# Patient Record
Sex: FEMALE | Race: WHITE | NOT HISPANIC OR LATINO | Employment: OTHER | URBAN - METROPOLITAN AREA
[De-identification: names, ages, dates, MRNs, and addresses within clinical notes are randomized per-mention and may not be internally consistent; named-entity substitution may affect disease eponyms.]

---

## 2017-02-03 ENCOUNTER — OFFICE VISIT (OUTPATIENT)
Dept: CARDIOLOGY | Facility: CLINIC | Age: 72
End: 2017-02-03

## 2017-02-03 VITALS
SYSTOLIC BLOOD PRESSURE: 135 MMHG | BODY MASS INDEX: 27.5 KG/M2 | HEIGHT: 67 IN | HEART RATE: 79 BPM | WEIGHT: 175.2 LBS | DIASTOLIC BLOOD PRESSURE: 60 MMHG

## 2017-02-03 DIAGNOSIS — I50.22 CHRONIC SYSTOLIC CONGESTIVE HEART FAILURE (HCC): Primary | ICD-10-CM

## 2017-02-03 DIAGNOSIS — E78.2 MIXED HYPERLIPIDEMIA: ICD-10-CM

## 2017-02-03 DIAGNOSIS — I44.7 LBBB (LEFT BUNDLE BRANCH BLOCK): ICD-10-CM

## 2017-02-03 PROCEDURE — 99213 OFFICE O/P EST LOW 20 MIN: CPT | Performed by: INTERNAL MEDICINE

## 2017-02-03 NOTE — PROGRESS NOTES
[facility]  Office Progress Note  Monica Stafford  1945  [phone]      Visit Date: 02/03/2017    PCP: Julio Mason MD  [address] [facility]  [facility] [address]    IDENTIFICATION: A 71 y.o. female  from [place]/ [facility] in [place].    Chief Complaint   Patient presents with   • Follow-up     CHF/HTN/LBBB     PROBLEM LIST:  CHF  2008 Presumed viral myopathy  [facility] bx - med rx- resolved by 2009  LBBB chronic 2006 8/16 MPS wnl EF 70%  HL  HTN  Nissen fundoplication 2016 [facility]      Allergies  No Known Allergies    Current Medications    Current Outpatient Prescriptions:   •  allopurinol (ZYLOPRIM) 100 MG tablet, Take 100 mg by mouth daily., Disp: , Rfl:   •  aspirin 81 MG EC tablet, Take 81 mg by mouth daily., Disp: , Rfl:   •  atorvastatin (LIPITOR) 40 MG tablet, Take 40 mg by mouth daily., Disp: , Rfl:   •  Biotin 5000 MCG capsule, Take 5,000 mg by mouth., Disp: , Rfl:   •  carvedilol (COREG) 12.5 MG tablet, Take 12.5 mg by mouth 2 (two) times a day with meals., Disp: , Rfl:   •  Cholecalciferol (VITAMIN D) 2000 UNITS capsule, Take  by mouth., Disp: , Rfl:   •  docusate sodium (COLACE) 100 MG capsule, Take 100 mg by mouth daily., Disp: , Rfl:   •  furosemide (LASIX) 20 MG tablet, Take 20 mg by mouth daily as needed., Disp: , Rfl:   •  loratadine (CLARITIN) 10 MG tablet, Take 10 mg by mouth daily., Disp: , Rfl:   •  Melatonin 3 MG capsule, Take 3 mg by mouth daily., Disp: , Rfl:   •  potassium chloride (K-DUR) 10 MEQ CR tablet, Take 10 mEq by mouth daily as needed., Disp: , Rfl:   •  traZODone (DESYREL) 50 MG tablet, Take 50 mg by mouth every night., Disp: , Rfl:   •  valsartan (DIOVAN) 160 MG tablet, Take 160 mg by mouth daily., Disp: , Rfl:       History of Present Illness     Pt denies any chest pain, dyspnea, dyspnea on exertion, orthopnea, PND, palpitations, lower extremity edema.  [place]    ROS:  All systems have  "been reviewed and are negative with the exception of those mentioned in the HPI.    OBJECTIVE:  Vitals:    02/03/17 1429   BP: 135/60   BP Location: Left arm   Patient Position: Sitting   Pulse: 79   Weight: 175 lb 3.2 oz (79.5 kg)   Height: 67\" (170.2 cm)     Physical Exam   Constitutional: She appears well-developed and well-nourished.   Neck: Normal range of motion. Neck supple. No hepatojugular reflux and no JVD present. Carotid bruit is not present. No tracheal deviation present. No thyromegaly present.   Cardiovascular: Normal rate, regular rhythm, S1 normal, S2 normal, intact distal pulses and normal pulses.  PMI is not displaced.  Exam reveals no gallop, no distant heart sounds, no friction rub, no midsystolic click and no opening snap.    No murmur heard.  Pulses:       Radial pulses are 2+ on the right side, and 2+ on the left side.        Dorsalis pedis pulses are 2+ on the right side, and 2+ on the left side.        Posterior tibial pulses are 2+ on the right side, and 2+ on the left side.   Pulmonary/Chest: Effort normal and breath sounds normal. She has no wheezes. She has no rales.   Abdominal: Soft. Bowel sounds are normal. She exhibits no mass. There is no tenderness. There is no guarding.       Diagnostic Data:  Procedures      ASSESSMENT:   Diagnosis Plan   1. Chronic systolic congestive heart failure     2. LBBB (left bundle branch block)     3. Mixed hyperlipidemia         PLAN:  NICM - now preserved LVEF.    Chronic LBBB    HTN controlled on regimen    HL on statin        Julio Mason MD, thank you for referring Ms. Stafford for evaluation.  I have forwarded my electronically generated recommendations to you for review.  Please do not hesitate to call with any questions.      Jasvir Tan MD, [facility]  "

## 2018-02-09 ENCOUNTER — OFFICE VISIT (OUTPATIENT)
Dept: CARDIOLOGY | Facility: CLINIC | Age: 73
End: 2018-02-09

## 2018-02-09 VITALS
BODY MASS INDEX: 27.31 KG/M2 | HEIGHT: 67 IN | OXYGEN SATURATION: 97 % | SYSTOLIC BLOOD PRESSURE: 138 MMHG | WEIGHT: 174 LBS | DIASTOLIC BLOOD PRESSURE: 78 MMHG | HEART RATE: 68 BPM

## 2018-02-09 DIAGNOSIS — I44.7 LBBB (LEFT BUNDLE BRANCH BLOCK): ICD-10-CM

## 2018-02-09 DIAGNOSIS — R00.2 PALPITATIONS: Primary | ICD-10-CM

## 2018-02-09 DIAGNOSIS — E78.2 MIXED HYPERLIPIDEMIA: ICD-10-CM

## 2018-02-09 DIAGNOSIS — I10 ESSENTIAL HYPERTENSION: ICD-10-CM

## 2018-02-09 DIAGNOSIS — I50.22 CHRONIC SYSTOLIC CONGESTIVE HEART FAILURE (HCC): ICD-10-CM

## 2018-02-09 PROCEDURE — 99214 OFFICE O/P EST MOD 30 MIN: CPT | Performed by: INTERNAL MEDICINE

## 2018-02-09 RX ORDER — ZOLPIDEM TARTRATE 10 MG/1
10 TABLET ORAL AS NEEDED
COMMUNITY
Start: 2018-01-23 | End: 2020-01-13

## 2018-02-09 RX ORDER — LATANOPROST 50 UG/ML
1 SOLUTION/ DROPS OPHTHALMIC DAILY
COMMUNITY
Start: 2018-01-25

## 2018-02-09 NOTE — PROGRESS NOTES
[facility]  Office Progress Note  Monica Stafford  1945  [phone]      Visit Date: 02/03/2017    PCP: Julio Mason MD  [address] [facility]  [facility] [address]    IDENTIFICATION: A 72 y.o. female  from [place]/ [facility] in [place].    Chief Complaint   Patient presents with   • Congestive Heart Failure   • LBBB   • fluttering     PROBLEM LIST:  CHF  2008 Presumed viral myopathy  [facility] bx - med rx- resolved by 2009  LBBB chronic 2006 8/16 MPS wnl EF 70%  HL  HTN  Nissen fundoplication 2016 [facility]      Allergies  No Known Allergies    Current Medications    Current Outpatient Prescriptions:   •  allopurinol (ZYLOPRIM) 100 MG tablet, Take 100 mg by mouth daily., Disp: , Rfl:   •  aspirin 81 MG EC tablet, Take 81 mg by mouth daily., Disp: , Rfl:   •  atorvastatin (LIPITOR) 40 MG tablet, Take 40 mg by mouth daily., Disp: , Rfl:   •  Biotin 5000 MCG capsule, Take 5,000 mg by mouth Daily., Disp: , Rfl:   •  Calcium Carb-Cholecalciferol (CALCIUM + D3 PO), Take  by mouth Daily., Disp: , Rfl:   •  carvedilol (COREG) 12.5 MG tablet, Take 12.5 mg by mouth 2 (two) times a day with meals., Disp: , Rfl:   •  docusate sodium (COLACE) 100 MG capsule, Take 100 mg by mouth daily., Disp: , Rfl:   •  furosemide (LASIX) 20 MG tablet, Take 20 mg by mouth daily as needed., Disp: , Rfl:   •  latanoprost (XALATAN) 0.005 % ophthalmic solution, Administer 1 drop to both eyes Daily., Disp: , Rfl:   •  loratadine (CLARITIN) 10 MG tablet, Take 10 mg by mouth daily., Disp: , Rfl:   •  potassium chloride (K-DUR) 10 MEQ CR tablet, Take 10 mEq by mouth daily as needed., Disp: , Rfl:   •  traZODone (DESYREL) 50 MG tablet, Take 50 mg by mouth As Needed., Disp: , Rfl:   •  valsartan (DIOVAN) 160 MG tablet, Take 160 mg by mouth daily., Disp: , Rfl:   •  zolpidem (AMBIEN) 10 MG tablet, Every Night., Disp: , Rfl:       History of Present Illness     Pt Crescendo pattern of  "increased palpitations over the last several weeks.  Her mother passed this last November and she states she's not been resting well.  She has no regular exercise regimen but has been compliant with her medications    ROS:  All systems have been reviewed and are negative with the exception of those mentioned in the HPI.    OBJECTIVE:  Vitals:    02/09/18 1137 02/09/18 1253 02/09/18 1254   BP: 170/90 138/78 138/78   BP Location: Left arm     Patient Position: Sitting     Pulse: 68     SpO2: 97%     Weight: 78.9 kg (174 lb)     Height: 170.2 cm (67\")       Physical Exam   Constitutional: She appears well-developed and well-nourished.   Neck: Normal range of motion. Neck supple. No hepatojugular reflux and no JVD present. Carotid bruit is not present. No tracheal deviation present. No thyromegaly present.   Cardiovascular: Normal rate, regular rhythm, S1 normal, S2 normal, intact distal pulses and normal pulses.  PMI is not displaced.  Exam reveals no gallop, no distant heart sounds, no friction rub, no midsystolic click and no opening snap.    No murmur heard.  Pulses:       Radial pulses are 2+ on the right side, and 2+ on the left side.        Dorsalis pedis pulses are 2+ on the right side, and 2+ on the left side.        Posterior tibial pulses are 2+ on the right side, and 2+ on the left side.   Pulmonary/Chest: Effort normal and breath sounds normal. She has no wheezes. She has no rales.   Abdominal: Soft. Bowel sounds are normal. She exhibits no mass. There is no tenderness. There is no guarding.       Diagnostic Data:  Procedures      ASSESSMENT:   Diagnosis Plan   1. Palpitations  Holter Monitor - 72 Hour Up To 21 Days   2. Essential hypertension     3. Mixed hyperlipidemia     4. LBBB (left bundle branch block)     5. Chronic systolic congestive heart failure         PLAN:  Palpitations Will Pl., Zyloprim as delineate need for reevaluation of structural heart disease.  Otherwise we'll continue current medical " regimen    NICM - now preserved LVEF.    Chronic LBBB    HTN controlled on regimen    HL on statin        Julio Mason MD, thank you for referring Ms. Stafford for evaluation.  I have forwarded my electronically generated recommendations to you for review.  Please do not hesitate to call with any questions.      Jasvir Tan MD, FACC

## 2018-03-30 ENCOUNTER — TELEPHONE (OUTPATIENT)
Dept: CARDIOLOGY | Facility: CLINIC | Age: 73
End: 2018-03-30

## 2018-09-04 ENCOUNTER — TELEPHONE (OUTPATIENT)
Dept: CARDIOLOGY | Facility: CLINIC | Age: 73
End: 2018-09-04

## 2018-09-04 RX ORDER — LOSARTAN POTASSIUM 50 MG/1
50 TABLET ORAL DAILY
Qty: 30 TABLET | Refills: 11 | Status: SHIPPED | OUTPATIENT
Start: 2018-09-04 | End: 2019-09-06 | Stop reason: SDUPTHER

## 2018-09-04 NOTE — TELEPHONE ENCOUNTER
The patient called inquiring as to what you would like to change her valsartan to.  Hx: chronic systolic CHF and essential htn.  Please advise.

## 2019-01-07 ENCOUNTER — OFFICE VISIT (OUTPATIENT)
Dept: CARDIOLOGY | Facility: CLINIC | Age: 74
End: 2019-01-07

## 2019-01-07 VITALS
OXYGEN SATURATION: 94 % | HEART RATE: 68 BPM | DIASTOLIC BLOOD PRESSURE: 70 MMHG | HEIGHT: 67 IN | WEIGHT: 175 LBS | SYSTOLIC BLOOD PRESSURE: 124 MMHG | BODY MASS INDEX: 27.47 KG/M2

## 2019-01-07 DIAGNOSIS — I10 ESSENTIAL HYPERTENSION: ICD-10-CM

## 2019-01-07 DIAGNOSIS — R00.2 PALPITATIONS: Primary | ICD-10-CM

## 2019-01-07 DIAGNOSIS — I44.7 LBBB (LEFT BUNDLE BRANCH BLOCK): ICD-10-CM

## 2019-01-07 DIAGNOSIS — I50.22 CHRONIC SYSTOLIC CONGESTIVE HEART FAILURE (HCC): ICD-10-CM

## 2019-01-07 PROCEDURE — 99213 OFFICE O/P EST LOW 20 MIN: CPT | Performed by: INTERNAL MEDICINE

## 2019-01-07 NOTE — PROGRESS NOTES
[facility]  Office Progress Note  Monica Stafford  1945  [phone]      Visit Date: 1/7/2019      PCP: Julio Mason MD  [address] [facility] [address]    IDENTIFICATION: A 73 y.o. female  from [place]/ [facility] [place].    Chief Complaint   Patient presents with   • Congestive Heart Failure     PROBLEM LIST:  CHF  2008 Presumed viral myopathy  [facility] [place] bx - med rx- resolved by 2009  LBBB chronic 2006 8/16 MPS wnl EF 70%  2018 zio wnl  HL  HTN  Nissen fundoplication 2016 [facility]      Allergies  No Known Allergies    Current Medications    Current Outpatient Medications:   •  allopurinol (ZYLOPRIM) 100 MG tablet, Take 100 mg by mouth daily., Disp: , Rfl:   •  aspirin 81 MG EC tablet, Take 81 mg by mouth daily., Disp: , Rfl:   •  atorvastatin (LIPITOR) 40 MG tablet, Take 40 mg by mouth daily., Disp: , Rfl:   •  Biotin 5000 MCG capsule, Take 5,000 mg by mouth Daily., Disp: , Rfl:   •  Calcium Carb-Cholecalciferol (CALCIUM + D3 PO), Take  by mouth Daily., Disp: , Rfl:   •  carvedilol (COREG) 12.5 MG tablet, Take 12.5 mg by mouth 2 (two) times a day with meals., Disp: , Rfl:   •  docusate sodium (COLACE) 100 MG capsule, Take 100 mg by mouth As Needed., Disp: , Rfl:   •  furosemide (LASIX) 20 MG tablet, Take 20 mg by mouth daily as needed., Disp: , Rfl:   •  latanoprost (XALATAN) 0.005 % ophthalmic solution, Administer 1 drop to both eyes Daily., Disp: , Rfl:   •  loratadine (CLARITIN) 10 MG tablet, Take 10 mg by mouth daily., Disp: , Rfl:   •  losartan (COZAAR) 50 MG tablet, Take 1 tablet by mouth Daily., Disp: 30 tablet, Rfl: 11  •  potassium chloride (K-DUR) 10 MEQ CR tablet, Take 10 mEq by mouth daily as needed., Disp: , Rfl:   •  zolpidem (AMBIEN) 10 MG tablet, 10 mg As Needed., Disp: , Rfl:       History of Present Illness     Pt decreased  palpitations since her elderly parents passed last year.    She has no regular exercise  "regimen but has been compliant with her medications.  Planning to visit [place] to her condo this winter.    ROS:  All systems have been reviewed and are negative with the exception of those mentioned in the HPI.    OBJECTIVE:  Vitals:    01/07/19 1103   BP: 124/70   BP Location: Right arm   Patient Position: Sitting   Pulse: 68   SpO2: 94%   Weight: 79.4 kg (175 lb)   Height: 170.2 cm (67\")     Physical Exam   Constitutional: She appears well-developed and well-nourished.   Neck: Normal range of motion. Neck supple. No hepatojugular reflux and no JVD present. Carotid bruit is not present. No tracheal deviation present. No thyromegaly present.   Cardiovascular: Normal rate, regular rhythm, S1 normal, S2 normal, intact distal pulses and normal pulses. PMI is not displaced. Exam reveals no gallop, no distant heart sounds, no friction rub, no midsystolic click and no opening snap.   No murmur heard.  Pulses:       Radial pulses are 2+ on the right side, and 2+ on the left side.        Dorsalis pedis pulses are 2+ on the right side, and 2+ on the left side.        Posterior tibial pulses are 2+ on the right side, and 2+ on the left side.   Pulmonary/Chest: Effort normal and breath sounds normal. She has no wheezes. She has no rales.   Abdominal: Soft. Bowel sounds are normal. She exhibits no mass. There is no tenderness. There is no guarding.       Diagnostic Data:  Procedures      ASSESSMENT:   Diagnosis Plan   1. Palpitations     2. Chronic systolic congestive heart failure (CMS/HCC)     3. LBBB (left bundle branch block)     4. Essential hypertension         PLAN:  Palpitations Will Pl.,  Otherwise we'll continue current medical regimen    NICM - now preserved LVEF.    Chronic LBBB    HTN controlled on regimen    HL on statin        Julio Mason MD, thank you for referring Ms. Stafford for evaluation.  I have forwarded my electronically generated recommendations to you for review.  Please do not hesitate " to call with any questions.      Jasvir Tan MD, FACC

## 2019-09-06 RX ORDER — LOSARTAN POTASSIUM 50 MG/1
TABLET ORAL
Qty: 30 TABLET | Refills: 4 | Status: SHIPPED | OUTPATIENT
Start: 2019-09-06 | End: 2019-09-17 | Stop reason: SDUPTHER

## 2019-09-17 RX ORDER — LOSARTAN POTASSIUM 50 MG/1
50 TABLET ORAL DAILY
Qty: 90 TABLET | Refills: 3 | Status: SHIPPED | OUTPATIENT
Start: 2019-09-17 | End: 2020-10-09

## 2020-01-08 NOTE — PROGRESS NOTES
[facility]  Office Progress Note  Monica Stafford  1945      Visit Date: 01/13/20    PCP: Julio Mason MD  [address] [facility] [address]    IDENTIFICATION: A 74 y.o. female  from [place]/ [facility] in [place].      PROBLEM LIST:  1. CHF  1. 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 [facility] bx - med rx- resolved by 2009  2. LBBB chronic 2006  1. 8/16 MPS wnl EF 70%  2. 2018 zio wnl  3. HL  4. HTN  5. Nissen fundoplication 2016 [facility]    Chief Complaint   Patient presents with   • Congestive Heart Failure       Allergies  No Known Allergies    Current Medications    Current Outpatient Medications:   •  allopurinol (ZYLOPRIM) 100 MG tablet, Take 100 mg by mouth daily., Disp: , Rfl:   •  aspirin 81 MG EC tablet, Take 81 mg by mouth daily., Disp: , Rfl:   •  atorvastatin (LIPITOR) 40 MG tablet, Take 40 mg by mouth daily., Disp: , Rfl:   •  Biotin 5000 MCG capsule, Take 5,000 mg by mouth Daily., Disp: , Rfl:   •  Calcium Carb-Cholecalciferol (CALCIUM + D3 PO), Take  by mouth Daily., Disp: , Rfl:   •  carvedilol (COREG) 12.5 MG tablet, Take 12.5 mg by mouth 2 (two) times a day with meals., Disp: , Rfl:   •  docusate sodium (COLACE) 100 MG capsule, Take 100 mg by mouth As Needed., Disp: , Rfl:   •  furosemide (LASIX) 20 MG tablet, Take 20 mg by mouth daily as needed., Disp: , Rfl:   •  latanoprost (XALATAN) 0.005 % ophthalmic solution, Administer 1 drop to both eyes Daily., Disp: , Rfl:   •  loratadine (CLARITIN) 10 MG tablet, Take 10 mg by mouth daily., Disp: , Rfl:   •  losartan (COZAAR) 50 MG tablet, Take 1 tablet by mouth Daily., Disp: 90 tablet, Rfl: 3  •  montelukast (SINGULAIR) 10 MG tablet, 10 mg Daily., Disp: , Rfl:   •  potassium chloride (K-DUR) 10 MEQ CR tablet, Take 10 mEq by mouth daily as needed., Disp: , Rfl:   •  traZODone (DESYREL) 50 MG tablet, 50 mg Every Night., Disp: , Rfl:       History of Present Illness   Monica Stafford is a  "74 y.o. year old female here for follow up. Reports she has started allergy injections, is hoping this helps with upcoming allergy season this spring. Reports having one episode of CP radiating to her back when driving to [place] in October, she took 1 spray of ntg and pulled over, but the pain subsided quickly. Pt denies any other chest pain, dyspnea, dyspnea on exertion, orthopnea, PND, palpitations, lower extremity edema, or claudication. She stays active, does yardwork in the summer, goes to the gym usually a few times a week in the winter. BP is controlled. Weight is down 11 lbs.       OBJECTIVE:  Vitals:    01/13/20 0956   BP: 122/68   BP Location: Right arm   Patient Position: Sitting   Pulse: 68   SpO2: 97%   Weight: 74.7 kg (164 lb 9.6 oz)   Height: 170.2 cm (67\")     Physical Exam   Constitutional: She is oriented to person, place, and time. She appears well-developed and well-nourished. No distress.   Cardiovascular: Normal rate, regular rhythm, normal heart sounds and intact distal pulses.   No murmur heard.  Pulses:       Radial pulses are 2+ on the right side, and 2+ on the left side.        Dorsalis pedis pulses are 2+ on the right side, and 2+ on the left side.        Posterior tibial pulses are 2+ on the right side, and 2+ on the left side.   Pulmonary/Chest: Effort normal and breath sounds normal. She has no wheezes. She has no rales.   Abdominal: Soft. Bowel sounds are normal. There is no tenderness. There is no guarding.   Musculoskeletal: She exhibits no edema or tenderness.   Neurological: She is alert and oriented to person, place, and time.   Skin: Skin is warm and dry. No rash noted.   Psychiatric: She has a normal mood and affect.   Nursing note and vitals reviewed.      Diagnostic Data:  Procedures      ASSESSMENT:   Diagnosis Plan   1. Palpitations     2. LBBB (left bundle branch block)     3. Mixed hyperlipidemia     4. Essential hypertension         PLAN:  1. No breakthrough symptoms. " Continue current medical management and continue to exercise.    2. Chronic LBBB, will follow EKG again next year  3. Continue statin therapy.   4. Patient has acceptable BP. Continue current medical management. Counseled to regularly check BP at home with goal averaging <130/80.     Julio Mason MD, thank you for referring Ms. Stafford for evaluation.  I have forwarded my electronically generated recommendations to you for review.  Please do not hesitate to call with any questions.    Scribed for Jasvir Tan MD by Ariana Carter PA-C. 1/13/2020  10:21 AM   I, Jasvir Tan MD, personally performed the services described in this documentation as scribed by the above named individual in my presence, and it is both accurate and complete.  1/13/2020  10:21 AM    Jasvir Tan MD, [facility]

## 2020-01-13 ENCOUNTER — OFFICE VISIT (OUTPATIENT)
Dept: CARDIOLOGY | Facility: CLINIC | Age: 75
End: 2020-01-13

## 2020-01-13 VITALS
DIASTOLIC BLOOD PRESSURE: 68 MMHG | WEIGHT: 164.6 LBS | SYSTOLIC BLOOD PRESSURE: 122 MMHG | HEIGHT: 67 IN | OXYGEN SATURATION: 97 % | BODY MASS INDEX: 25.83 KG/M2 | HEART RATE: 68 BPM

## 2020-01-13 DIAGNOSIS — R00.2 PALPITATIONS: Primary | ICD-10-CM

## 2020-01-13 DIAGNOSIS — I10 ESSENTIAL HYPERTENSION: ICD-10-CM

## 2020-01-13 DIAGNOSIS — I44.7 LBBB (LEFT BUNDLE BRANCH BLOCK): ICD-10-CM

## 2020-01-13 DIAGNOSIS — E78.2 MIXED HYPERLIPIDEMIA: ICD-10-CM

## 2020-01-13 PROCEDURE — 99214 OFFICE O/P EST MOD 30 MIN: CPT | Performed by: INTERNAL MEDICINE

## 2020-01-13 RX ORDER — TRAZODONE HYDROCHLORIDE 50 MG/1
50 TABLET ORAL NIGHTLY
COMMUNITY
Start: 2019-11-04

## 2020-01-13 RX ORDER — MONTELUKAST SODIUM 10 MG/1
10 TABLET ORAL DAILY
COMMUNITY
Start: 2020-01-10

## 2020-10-09 RX ORDER — LOSARTAN POTASSIUM 50 MG/1
TABLET ORAL
Qty: 90 TABLET | Refills: 2 | Status: SHIPPED | OUTPATIENT
Start: 2020-10-09 | End: 2021-03-01 | Stop reason: SDUPTHER

## 2021-01-11 ENCOUNTER — IMMUNIZATION (OUTPATIENT)
Dept: VACCINE CLINIC | Facility: HOSPITAL | Age: 76
End: 2021-01-11

## 2021-01-11 PROCEDURE — 0001A: CPT | Performed by: FAMILY MEDICINE

## 2021-01-11 PROCEDURE — 91300 HC SARSCOV02 VAC 30MCG/0.3ML IM: CPT | Performed by: FAMILY MEDICINE

## 2021-02-09 ENCOUNTER — IMMUNIZATION (OUTPATIENT)
Dept: VACCINE CLINIC | Facility: HOSPITAL | Age: 76
End: 2021-02-09

## 2021-02-09 PROCEDURE — 91300 HC SARSCOV02 VAC 30MCG/0.3ML IM: CPT | Performed by: INTERNAL MEDICINE

## 2021-02-09 PROCEDURE — 0002A: CPT | Performed by: INTERNAL MEDICINE

## 2021-03-01 ENCOUNTER — OFFICE VISIT (OUTPATIENT)
Dept: CARDIOLOGY | Facility: CLINIC | Age: 76
End: 2021-03-01

## 2021-03-01 VITALS
DIASTOLIC BLOOD PRESSURE: 68 MMHG | OXYGEN SATURATION: 96 % | HEIGHT: 67 IN | SYSTOLIC BLOOD PRESSURE: 116 MMHG | BODY MASS INDEX: 25.11 KG/M2 | HEART RATE: 73 BPM | WEIGHT: 160 LBS

## 2021-03-01 DIAGNOSIS — I10 ESSENTIAL HYPERTENSION: ICD-10-CM

## 2021-03-01 DIAGNOSIS — I44.7 LBBB (LEFT BUNDLE BRANCH BLOCK): ICD-10-CM

## 2021-03-01 DIAGNOSIS — E78.2 MIXED HYPERLIPIDEMIA: ICD-10-CM

## 2021-03-01 DIAGNOSIS — I50.22 CHRONIC SYSTOLIC CONGESTIVE HEART FAILURE (HCC): Primary | ICD-10-CM

## 2021-03-01 PROCEDURE — 99214 OFFICE O/P EST MOD 30 MIN: CPT | Performed by: INTERNAL MEDICINE

## 2021-03-01 RX ORDER — FUROSEMIDE 20 MG/1
20 TABLET ORAL DAILY PRN
Qty: 30 TABLET | Refills: 3 | Status: SHIPPED | OUTPATIENT
Start: 2021-03-01 | End: 2022-02-11 | Stop reason: DRUGHIGH

## 2021-03-01 RX ORDER — ATORVASTATIN CALCIUM 40 MG/1
40 TABLET, FILM COATED ORAL DAILY
Qty: 90 TABLET | Refills: 3 | Status: SHIPPED | OUTPATIENT
Start: 2021-03-01 | End: 2021-10-04 | Stop reason: ALTCHOICE

## 2021-03-01 RX ORDER — CARVEDILOL 12.5 MG/1
12.5 TABLET ORAL 2 TIMES DAILY WITH MEALS
Qty: 90 TABLET | Refills: 3 | Status: SHIPPED | OUTPATIENT
Start: 2021-03-01 | End: 2023-02-16 | Stop reason: SDUPTHER

## 2021-03-01 RX ORDER — LOSARTAN POTASSIUM 50 MG/1
50 TABLET ORAL DAILY
Qty: 90 TABLET | Refills: 2 | Status: SHIPPED | OUTPATIENT
Start: 2021-03-01 | End: 2022-01-14

## 2021-03-01 NOTE — PROGRESS NOTES
[facility]  Office Progress Note  Monica Stafford  1945      Visit Date: 03/01/21    PCP: Julio Mason MD  [address] [facility] [address]    IDENTIFICATION: A 75 y.o. female  from [place]/ [facility] in [facility] [place].      PROBLEM LIST:  1. CHF  1. 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 [facility] bx - med rx- resolved by 2009  3. 8/12 echo osh EF 60%  2. LBBB chronic 2006  1. 8/16 MPS wnl EF 70%  2. 2018 zio wnl  3. HL  4. HTN  5. Nissen fundoplication 2016 [facility]  6. 1/21 partial colectomy w subsequent emergent reop (leak)- [facility]    Chief Complaint   Patient presents with   • Congestive Heart Failure       Allergies  No Known Allergies    Current Medications    Current Outpatient Medications:   •  allopurinol (ZYLOPRIM) 100 MG tablet, Take 100 mg by mouth daily., Disp: , Rfl:   •  aspirin 81 MG EC tablet, Take 81 mg by mouth daily., Disp: , Rfl:   •  atorvastatin (LIPITOR) 40 MG tablet, Take 1 tablet by mouth Daily., Disp: 90 tablet, Rfl: 3  •  Biotin 5000 MCG capsule, Take 5,000 mg by mouth Daily., Disp: , Rfl:   •  Calcium Carb-Cholecalciferol (CALCIUM + D3 PO), Take  by mouth Daily., Disp: , Rfl:   •  carvedilol (COREG) 12.5 MG tablet, Take 1 tablet by mouth 2 (Two) Times a Day With Meals., Disp: 90 tablet, Rfl: 3  •  docusate sodium (COLACE) 100 MG capsule, Take 100 mg by mouth As Needed., Disp: , Rfl:   •  furosemide (LASIX) 20 MG tablet, Take 1 tablet by mouth Daily As Needed (weight gain 2lb /24 hour)., Disp: 30 tablet, Rfl: 3  •  latanoprost (XALATAN) 0.005 % ophthalmic solution, Administer 1 drop to both eyes Daily., Disp: , Rfl:   •  loratadine (CLARITIN) 10 MG tablet, Take 10 mg by mouth daily., Disp: , Rfl:   •  losartan (COZAAR) 50 MG tablet, Take 1 tablet by mouth Daily., Disp: 90 tablet, Rfl: 2  •  montelukast (SINGULAIR) 10 MG tablet, 10 mg Daily., Disp: , Rfl:   •  potassium chloride (K-DUR) 10 MEQ CR tablet, Take 10 mEq by mouth  "daily as needed., Disp: , Rfl:   •  traZODone (DESYREL) 50 MG tablet, 50 mg Every Night., Disp: , Rfl:       History of Present Illness   Monica Stafford is a 75 y.o. year old female here for follow up.  She has had a miserable last few months with complicated bowel surgery at [facility].  She had no cardiac issues periprocedurally.  She was noted with a bowel leak following her initial surgery and emergent surgery was unnecessary 5 days later.  She states that she is 6 weeks out and is gradually feeling better.    OBJECTIVE:  Vitals:    03/01/21 1058   BP: 116/68   BP Location: Right arm   Patient Position: Sitting   Pulse: 73   SpO2: 96%   Weight: 72.6 kg (160 lb)   Height: 170.2 cm (67\")     Physical Exam   Constitutional: She is oriented to person, place, and time. She appears well-developed and well-nourished. No distress.   Cardiovascular: Normal rate, regular rhythm, normal heart sounds and intact distal pulses.   No murmur heard.  Pulses:       Radial pulses are 2+ on the right side and 2+ on the left side.        Dorsalis pedis pulses are 2+ on the right side and 2+ on the left side.        Posterior tibial pulses are 2+ on the right side and 2+ on the left side.   Pulmonary/Chest: Effort normal and breath sounds normal. She has no wheezes. She has no rales.   Abdominal: Soft. Bowel sounds are normal. There is no abdominal tenderness. There is no guarding.   Musculoskeletal:         General: No tenderness or edema.   Neurological: She is alert and oriented to person, place, and time.   Skin: Skin is warm and dry. No rash noted.   Psychiatric: She has a normal mood and affect.   Nursing note and vitals reviewed.      Diagnostic Data:  Procedures      ASSESSMENT:   Diagnosis Plan   1. Chronic systolic congestive heart failure (CMS/HCC)  Adult Transthoracic Echo Complete W/ Cont if Necessary Per Protocol   2. LBBB (left bundle branch block)     3. Mixed hyperlipidemia     4. Essential hypertension   "       PLAN:  1. No breakthrough symptoms. Continue current medical management and continue to exercise.  Follow-up echocardiogram in 6 months  2. Chronic LBBB, will follow EKG again next year  3. Continue statin therapy.   4. Patient has acceptable BP. Continue current medical management. Counseled to regularly check BP at home with goal averaging <130/80.     Julio Mason MD, thank you for referring Ms. Stafford for evaluation.  I have forwarded my electronically generated recommendations to you for review.  Please do not hesitate to call with any questions.    3/1/2021  11:20 CAROLYN Tan MD, FACC

## 2021-03-17 ENCOUNTER — HOSPITAL ENCOUNTER (OUTPATIENT)
Dept: CARDIOLOGY | Facility: HOSPITAL | Age: 76
Discharge: HOME | End: 2021-03-17
Admitting: INTERNAL MEDICINE

## 2021-03-17 DIAGNOSIS — I50.22 CHRONIC SYSTOLIC CONGESTIVE HEART FAILURE (HCC): ICD-10-CM

## 2021-03-17 LAB
BH CV ECHO MEAS - AO MAX PG (FULL): 4.1 MMHG
BH CV ECHO MEAS - AO MAX PG: 6 MMHG
BH CV ECHO MEAS - AO MEAN PG (FULL): 2 MMHG
BH CV ECHO MEAS - AO MEAN PG: 3 MMHG
BH CV ECHO MEAS - AO ROOT AREA (BSA CORRECTED): 1.7
BH CV ECHO MEAS - AO ROOT AREA: 8 CM^2
BH CV ECHO MEAS - AO ROOT DIAM: 3.2 CM
BH CV ECHO MEAS - AO V2 MAX: 124 CM/SEC
BH CV ECHO MEAS - AO V2 MEAN: 74.4 CM/SEC
BH CV ECHO MEAS - AO V2 VTI: 29.5 CM
BH CV ECHO MEAS - ASC AORTA: 2.6 CM
BH CV ECHO MEAS - AVA(I,A): 1.1 CM^2
BH CV ECHO MEAS - AVA(I,D): 1.1 CM^2
BH CV ECHO MEAS - AVA(V,A): 1.1 CM^2
BH CV ECHO MEAS - AVA(V,D): 1.1 CM^2
BH CV ECHO MEAS - BSA(HAYCOCK): 1.8 M^2
BH CV ECHO MEAS - BSA: 1.8 M^2
BH CV ECHO MEAS - BZI_BMI: 24.7 KILOGRAMS/M^2
BH CV ECHO MEAS - BZI_METRIC_HEIGHT: 170.2 CM
BH CV ECHO MEAS - BZI_METRIC_WEIGHT: 71.7 KG
BH CV ECHO MEAS - EDV(CUBED): 87.5 ML
BH CV ECHO MEAS - EDV(MOD-SP2): 71 ML
BH CV ECHO MEAS - EDV(MOD-SP4): 95 ML
BH CV ECHO MEAS - EDV(TEICH): 89.6 ML
BH CV ECHO MEAS - EF(CUBED): 48 %
BH CV ECHO MEAS - EF(MOD-BP): 46 %
BH CV ECHO MEAS - EF(MOD-SP2): 46.5 %
BH CV ECHO MEAS - EF(MOD-SP4): 49.5 %
BH CV ECHO MEAS - EF(TEICH): 40.4 %
BH CV ECHO MEAS - ESV(CUBED): 45.5 ML
BH CV ECHO MEAS - ESV(MOD-SP2): 38 ML
BH CV ECHO MEAS - ESV(MOD-SP4): 48 ML
BH CV ECHO MEAS - ESV(TEICH): 53.3 ML
BH CV ECHO MEAS - FS: 19.6 %
BH CV ECHO MEAS - IVS/LVPW: 0.9
BH CV ECHO MEAS - IVSD: 0.7 CM
BH CV ECHO MEAS - LA DIMENSION: 4.5 CM
BH CV ECHO MEAS - LA/AO: 1.4
BH CV ECHO MEAS - LAD MAJOR: 5.6 CM
BH CV ECHO MEAS - LAT PEAK E' VEL: 9.2 CM/SEC
BH CV ECHO MEAS - LATERAL E/E' RATIO: 10
BH CV ECHO MEAS - LV DIASTOLIC VOL/BSA (35-75): 51.9 ML/M^2
BH CV ECHO MEAS - LV MASS(C)D: 99.3 GRAMS
BH CV ECHO MEAS - LV MASS(C)DI: 54.3 GRAMS/M^2
BH CV ECHO MEAS - LV MAX PG: 1.9 MMHG
BH CV ECHO MEAS - LV MEAN PG: 1 MMHG
BH CV ECHO MEAS - LV SYSTOLIC VOL/BSA (12-30): 26.2 ML/M^2
BH CV ECHO MEAS - LV V1 MAX: 68.4 CM/SEC
BH CV ECHO MEAS - LV V1 MEAN: 39.7 CM/SEC
BH CV ECHO MEAS - LV V1 VTI: 16.4 CM
BH CV ECHO MEAS - LVIDD: 4.4 CM
BH CV ECHO MEAS - LVIDS: 3.6 CM
BH CV ECHO MEAS - LVLD AP2: 6.7 CM
BH CV ECHO MEAS - LVLD AP4: 6.9 CM
BH CV ECHO MEAS - LVLS AP2: 5.8 CM
BH CV ECHO MEAS - LVLS AP4: 6.5 CM
BH CV ECHO MEAS - LVOT AREA (M): 2 CM^2
BH CV ECHO MEAS - LVOT AREA: 2 CM^2
BH CV ECHO MEAS - LVOT DIAM: 1.6 CM
BH CV ECHO MEAS - LVPWD: 0.77 CM
BH CV ECHO MEAS - MED PEAK E' VEL: 4.7 CM/SEC
BH CV ECHO MEAS - MEDIAL E/E' RATIO: 19.6
BH CV ECHO MEAS - MV A MAX VEL: 107 CM/SEC
BH CV ECHO MEAS - MV DEC SLOPE: 308 CM/SEC^2
BH CV ECHO MEAS - MV DEC TIME: 0.23 SEC
BH CV ECHO MEAS - MV E MAX VEL: 92.3 CM/SEC
BH CV ECHO MEAS - MV E/A: 0.86
BH CV ECHO MEAS - MV P1/2T MAX VEL: 103 CM/SEC
BH CV ECHO MEAS - MV P1/2T: 97.9 MSEC
BH CV ECHO MEAS - MVA P1/2T LCG: 2.1 CM^2
BH CV ECHO MEAS - MVA(P1/2T): 2.2 CM^2
BH CV ECHO MEAS - PA ACC SLOPE: 715.5 CM/SEC^2
BH CV ECHO MEAS - PA ACC TIME: 0.12 SEC
BH CV ECHO MEAS - PA PR(ACCEL): 24.3 MMHG
BH CV ECHO MEAS - PI END-D VEL: 108 CM/SEC
BH CV ECHO MEAS - RAP SYSTOLE: 3 MMHG
BH CV ECHO MEAS - RVSP: 18.1 MMHG
BH CV ECHO MEAS - SI(AO): 129.7 ML/M^2
BH CV ECHO MEAS - SI(CUBED): 23 ML/M^2
BH CV ECHO MEAS - SI(LVOT): 18 ML/M^2
BH CV ECHO MEAS - SI(MOD-SP2): 18 ML/M^2
BH CV ECHO MEAS - SI(MOD-SP4): 25.7 ML/M^2
BH CV ECHO MEAS - SI(TEICH): 19.8 ML/M^2
BH CV ECHO MEAS - SV(AO): 237.3 ML
BH CV ECHO MEAS - SV(CUBED): 42 ML
BH CV ECHO MEAS - SV(LVOT): 33 ML
BH CV ECHO MEAS - SV(MOD-SP2): 33 ML
BH CV ECHO MEAS - SV(MOD-SP4): 47 ML
BH CV ECHO MEAS - SV(TEICH): 36.2 ML
BH CV ECHO MEAS - TAPSE (>1.6): 1.7 CM
BH CV ECHO MEAS - TR MAX PG: 15.1 MMHG
BH CV ECHO MEAS - TR MAX VEL: 194 CM/SEC
BH CV ECHO MEASUREMENTS AVERAGE E/E' RATIO: 13.28
BH CV XLRA - RV BASE: 2.8 CM
BH CV XLRA - RV LENGTH: 5.1 CM
BH CV XLRA - RV MID: 2.6 CM
LEFT ATRIUM VOLUME INDEX: 35.5 ML/M^2
LEFT ATRIUM VOLUME: 65 ML

## 2021-03-17 PROCEDURE — 93306 TTE W/DOPPLER COMPLETE: CPT

## 2021-03-17 PROCEDURE — 93306 TTE W/DOPPLER COMPLETE: CPT | Performed by: INTERNAL MEDICINE

## 2021-10-03 NOTE — PROGRESS NOTES
[facility] Cardiology  Office Progress Note  Monica Stafford  1945  [address] [place] [address]       Visit Date: 10/04/21    PCP: Julio Mason MD  [address] [facility] [address]  [place] [address]    IDENTIFICATION: A 75 y.o. 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    PROBLEM LIST:   1. CHF  1. 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 [facility] bx - med rx- resolved by 2009  3. 8/12 echo osh EF 60%  4. 3/21 2D Echo: Mild LA enlargement. Mild global hypokinesia of left ventricle. LVEF = 46%.   2. LBBB chronic 2006  1. 8/16 MPS wnl EF 70%  2. 2018 zio wnl  3. HL  4. HTN  5. Nissen fundoplication 2016 Ismael  6. 1/21 partial colectomy w subsequent emergent reop (leak)- Sanjay      CC:   Chief Complaint   Patient presents with   • Congestive Heart Failure       Allergies  No Known Allergies    Current Medications    Current Outpatient Medications:   •  allopurinol (ZYLOPRIM) 100 MG tablet, Take 100 mg by mouth daily., Disp: , Rfl:   •  aspirin 81 MG EC tablet, Take 81 mg by mouth daily., Disp: , Rfl:   •  Biotin 5000 MCG capsule, Take 5,000 mg by mouth Daily., Disp: , Rfl:   •  Calcium Carb-Cholecalciferol (CALCIUM + D3 PO), Take  by mouth Daily., Disp: , Rfl:   •  carvedilol (COREG) 12.5 MG tablet, Take 1 tablet by mouth 2 (Two) Times a Day With Meals., Disp: 90 tablet, Rfl: 3  •  Coenzyme Q10 (COQ10 PO), Take 1 tablet by mouth Daily., Disp: , Rfl:   •  docusate sodium (COLACE) 100 MG capsule, Take 100 mg by mouth As Needed., Disp: , Rfl:   •  furosemide (LASIX) 20 MG tablet, Take 1 tablet by mouth Daily As Needed (weight gain 2lb /24 hour)., Disp: 30 tablet, Rfl: 3  •  latanoprost (XALATAN) 0.005 % ophthalmic solution, Administer 1 drop to both eyes Daily., Disp: , Rfl:   •  loratadine (CLARITIN) 10 MG tablet, Take 10 mg by mouth daily., Disp: , Rfl:   •  losartan (COZAAR) 50 MG tablet, Take 1 tablet by mouth Daily., Disp: 90 tablet, Rfl: 2  •  montelukast (SINGULAIR)  "10 MG tablet, 10 mg Daily., Disp: , Rfl:   •  potassium chloride (K-DUR) 10 MEQ CR tablet, Take 10 mEq by mouth daily as needed., Disp: , Rfl:   •  rosuvastatin (CRESTOR) 5 MG tablet, Take 5 mg by mouth Daily., Disp: , Rfl:   •  traZODone (DESYREL) 50 MG tablet, 50 mg Every Night., Disp: , Rfl:   •  atorvastatin (LIPITOR) 40 MG tablet, Take 1 tablet by mouth Daily., Disp: 90 tablet, Rfl: 3      History of Present Illness   Monica Stafford is a 75 y.o. year old female here for follow up.    Pt denies any chest pain, dyspnea, dyspnea on exertion, orthopnea, PND, palpitations, lower extremity edema, or claudication.  She had improved irritable bowel issues with diminishing gluten intake.  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    Constitutional:       Appearance: Healthy appearance. Not in distress.   Neck:      Vascular: No JVR. JVD normal.   Pulmonary:      Effort: Pulmonary effort is normal.      Breath sounds: Normal breath sounds. No wheezing. No rhonchi. No rales.   Chest:      Chest wall: Not tender to palpatation.   Cardiovascular:      PMI at left midclavicular line. Normal rate. Regular rhythm. Normal S1. Normal S2.      Murmurs: There is no murmur.      No gallop. No click. No rub.   Pulses:     Intact distal pulses.   Edema:     Peripheral edema absent.   Abdominal:      General: Bowel sounds are normal.      Palpations: Abdomen is soft.      Tenderness: There is no abdominal tenderness.   Musculoskeletal: Normal range of motion.         General: No tenderness. Skin:     General: Skin is warm and dry.   Neurological:      General: No focal deficit present.      Mental Status: Alert and oriented to person, place and time.         Diagnostic " Data:    ECG 12 Lead    Date/Time: 10/4/2021 10:27 AM  Performed by: Jasvir Tan MD  Authorized by: Jasvir Tan MD   Previous ECG: no previous ECG available  Rhythm: sinus rhythm  BPM: 57  Conduction: left bundle branch block    Clinical impression: abnormal EKG              ASSESSMENT:   Diagnosis Plan   1. Chronic systolic congestive heart failure (HCC)     2. LBBB (left bundle branch block)     3. Primary hypertension     4. Mixed hyperlipidemia         PLAN:  Nonischemic cardiomyopathy New York Heart Association class II CHF continued medical management.  Sideration of echocardiogram the next 1 year    Hypertension controlled carvedilol losartan    Dyslipidemia on statin therapy          Jasvir Tan MD, [facility]

## 2021-10-04 ENCOUNTER — OFFICE VISIT (OUTPATIENT)
Dept: CARDIOLOGY | Facility: CLINIC | Age: 76
End: 2021-10-04

## 2021-10-04 VITALS
WEIGHT: 168 LBS | OXYGEN SATURATION: 97 % | SYSTOLIC BLOOD PRESSURE: 114 MMHG | DIASTOLIC BLOOD PRESSURE: 76 MMHG | HEART RATE: 63 BPM | HEIGHT: 67 IN | BODY MASS INDEX: 26.37 KG/M2

## 2021-10-04 DIAGNOSIS — I44.7 LBBB (LEFT BUNDLE BRANCH BLOCK): ICD-10-CM

## 2021-10-04 DIAGNOSIS — E78.2 MIXED HYPERLIPIDEMIA: ICD-10-CM

## 2021-10-04 DIAGNOSIS — I10 PRIMARY HYPERTENSION: ICD-10-CM

## 2021-10-04 DIAGNOSIS — I50.22 CHRONIC SYSTOLIC CONGESTIVE HEART FAILURE (HCC): Primary | ICD-10-CM

## 2021-10-04 PROCEDURE — 99214 OFFICE O/P EST MOD 30 MIN: CPT | Performed by: INTERNAL MEDICINE

## 2021-10-04 PROCEDURE — 93000 ELECTROCARDIOGRAM COMPLETE: CPT | Performed by: INTERNAL MEDICINE

## 2021-10-04 RX ORDER — ROSUVASTATIN CALCIUM 5 MG/1
10 TABLET, COATED ORAL DAILY
COMMUNITY

## 2022-01-14 RX ORDER — LOSARTAN POTASSIUM 50 MG/1
TABLET ORAL
Qty: 90 TABLET | Refills: 0 | Status: SHIPPED | OUTPATIENT
Start: 2022-01-14 | End: 2022-02-11 | Stop reason: DRUGHIGH

## 2022-02-11 ENCOUNTER — TELEPHONE (OUTPATIENT)
Dept: CARDIOLOGY | Facility: CLINIC | Age: 77
End: 2022-02-11

## 2022-02-11 RX ORDER — LOSARTAN POTASSIUM 50 MG/1
50 TABLET ORAL 2 TIMES DAILY
COMMUNITY
End: 2022-03-11 | Stop reason: SDUPTHER

## 2022-02-11 RX ORDER — FUROSEMIDE 20 MG/1
20 TABLET ORAL AS NEEDED
COMMUNITY
End: 2023-02-16 | Stop reason: SDUPTHER

## 2022-02-14 NOTE — PROGRESS NOTES
[facility] Cardiology  Office Progress Note  Monica Stafford  1945  [address] [place] [address]       Visit Date: 02/16/22    PCP: Julio Mason MD  [address] [facility] [address]  [place] [address]    IDENTIFICATION: A 76 y.o. female  from [place]/ [place] in [place]    PROBLEM LIST:   1. CHF  1. 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- resolved by 2009  3. 8/12 echo osh EF 60%  4. 3/21 2D Echo: Mild LA enlargement. Mild global hypokinesia of left ventricle. LVEF = 46%.   2. LBBB chronic 2006 1. 8/16 MPS wnl EF 70%  2. 2018 zio wnl  3. HL  4. HTN  5. Nissen fundoplication 2016 Ismael      CC:   Chief Complaint   Patient presents with   • Surgical Clearance   • Chronic systolic congestive heart failure   • Palpitations       Allergies  No Known Allergies    Current Medications    Current Outpatient Medications:   •  allopurinol (ZYLOPRIM) 100 MG tablet, Take 100 mg by mouth daily., Disp: , Rfl:   •  aspirin 81 MG EC tablet, Take 81 mg by mouth daily., Disp: , Rfl:   •  Biotin 5000 MCG capsule, Take 5,000 mg by mouth Daily., Disp: , Rfl:   •  Calcium Carb-Cholecalciferol (CALCIUM + D3 PO), Take  by mouth Daily., Disp: , Rfl:   •  carvedilol (COREG) 12.5 MG tablet, Take 1 tablet by mouth 2 (Two) Times a Day With Meals., Disp: 90 tablet, Rfl: 3  •  Coenzyme Q10 (COQ10 PO), Take 1 tablet by mouth Daily., Disp: , Rfl:   •  docusate sodium (COLACE) 100 MG capsule, Take 100 mg by mouth As Needed., Disp: , Rfl:   •  furosemide (LASIX) 20 MG tablet, Take 20 mg by mouth Daily., Disp: , Rfl:   •  latanoprost (XALATAN) 0.005 % ophthalmic solution, Administer 1 drop to both eyes Daily., Disp: , Rfl:   •  losartan (COZAAR) 50 MG tablet, Take 50 mg by mouth 2 (Two) Times a Day., Disp: , Rfl:   •  montelukast (SINGULAIR) 10 MG tablet, 10 mg Daily., Disp: , Rfl:   •  potassium chloride (K-DUR) 10 MEQ CR tablet, Take 10 mEq by mouth daily as needed., Disp: , Rfl:  "  •  rosuvastatin (CRESTOR) 5 MG tablet, Take 5 mg by mouth Daily., Disp: , Rfl:   •  traZODone (DESYREL) 50 MG tablet, 50 mg Every Night., Disp: , Rfl:       History of Present Illness   Monica Stafford is a 76 y.o. year old female here for follow up.    Scheduled to have knee surgery partial this spring.  She is noted her blood pressure has been elevated above 150s most recently.  She had increase losartan to 100 mg daily with some improvement but not ideal.  She is not taking NSAIDs or over-the-counter medications that could raise blood pressure.  She is probably she states eating more  sodium rich foods      OBJECTIVE:  Vitals:    02/16/22 1013   BP: 140/82   BP Location: Right arm   Patient Position: Sitting   Pulse: 74   SpO2: 97%   Weight: 77.4 kg (170 lb 9.6 oz)   Height: 170.2 cm (67\")     Body mass index is 26.72 kg/m².    Constitutional:       Appearance: Healthy appearance. Not in distress.   Neck:      Vascular: No JVR. JVD normal.   Pulmonary:      Effort: Pulmonary effort is normal.      Breath sounds: Normal breath sounds. No wheezing. No rhonchi. No rales.   Chest:      Chest wall: Not tender to palpatation.   Cardiovascular:      PMI at left midclavicular line. Normal rate. Regular rhythm. Normal S1. Normal S2.      Murmurs: There is no murmur.      No gallop. No click. No rub.   Pulses:     Intact distal pulses.   Edema:     Peripheral edema absent.   Abdominal:      General: Bowel sounds are normal.      Palpations: Abdomen is soft.      Tenderness: There is no abdominal tenderness.   Musculoskeletal: Normal range of motion.         General: No tenderness. Skin:     General: Skin is warm and dry.   Neurological:      General: No focal deficit present.      Mental Status: Alert and oriented to person, place and time.         Diagnostic Data:    ECG 12 Lead    Date/Time: 2/16/2022 10:38 AM  Performed by: Jasvir Tan MD  Authorized by: Jasvir Tan MD   Previous ECG: no previous ECG " available  Rhythm: sinus rhythm  BPM: 74  Conduction: left bundle branch block    Clinical impression: abnormal EKG              ASSESSMENT:   Diagnosis Plan   1. Chronic systolic congestive heart failure (HCC)     2. Primary hypertension     3. Mixed hyperlipidemia         PLAN:  Acceptable cardiac risk for proposed knee surgery    Continue losartan 100 add spironolactone 25 continue carvedilol.  Continue furosemide only as needed which is infrequently she states.  BMP which she will receive prior to surgery    Continue statin therapy for mixed dyslipidemia          Jasvir Tan MD, FACC

## 2022-02-16 ENCOUNTER — OFFICE VISIT (OUTPATIENT)
Dept: CARDIOLOGY | Facility: CLINIC | Age: 77
End: 2022-02-16

## 2022-02-16 VITALS
WEIGHT: 170.6 LBS | BODY MASS INDEX: 26.78 KG/M2 | HEART RATE: 74 BPM | DIASTOLIC BLOOD PRESSURE: 82 MMHG | HEIGHT: 67 IN | SYSTOLIC BLOOD PRESSURE: 140 MMHG | OXYGEN SATURATION: 97 %

## 2022-02-16 DIAGNOSIS — I50.22 CHRONIC SYSTOLIC CONGESTIVE HEART FAILURE: Primary | ICD-10-CM

## 2022-02-16 DIAGNOSIS — E78.2 MIXED HYPERLIPIDEMIA: ICD-10-CM

## 2022-02-16 DIAGNOSIS — I10 PRIMARY HYPERTENSION: ICD-10-CM

## 2022-02-16 PROCEDURE — 93000 ELECTROCARDIOGRAM COMPLETE: CPT | Performed by: INTERNAL MEDICINE

## 2022-02-16 PROCEDURE — 99214 OFFICE O/P EST MOD 30 MIN: CPT | Performed by: INTERNAL MEDICINE

## 2022-02-16 RX ORDER — SPIRONOLACTONE 25 MG/1
25 TABLET ORAL DAILY
Qty: 90 TABLET | Refills: 3 | Status: SHIPPED | OUTPATIENT
Start: 2022-02-16 | End: 2023-02-16 | Stop reason: SDUPTHER

## 2022-03-11 RX ORDER — LOSARTAN POTASSIUM 100 MG/1
100 TABLET ORAL DAILY
Qty: 90 TABLET | Refills: 0 | Status: SHIPPED | OUTPATIENT
Start: 2022-03-11 | End: 2023-02-16 | Stop reason: SDUPTHER

## 2022-10-20 ENCOUNTER — TELEPHONE (OUTPATIENT)
Dept: CARDIOLOGY | Facility: CLINIC | Age: 77
End: 2022-10-20

## 2022-10-20 NOTE — TELEPHONE ENCOUNTER
Patient is to have Left Total Knee replacement with Dr. Noriega at [facility] on 11/18/22. Patient is needing pre-surgical cardiac risk assessment. Patient has no cardiac complaints at this time. Please advise.

## 2023-02-16 ENCOUNTER — OFFICE VISIT (OUTPATIENT)
Dept: CARDIOLOGY | Facility: CLINIC | Age: 78
End: 2023-02-16
Payer: MEDICARE

## 2023-02-16 VITALS
DIASTOLIC BLOOD PRESSURE: 68 MMHG | SYSTOLIC BLOOD PRESSURE: 130 MMHG | HEART RATE: 67 BPM | BODY MASS INDEX: 26.72 KG/M2 | OXYGEN SATURATION: 98 % | HEIGHT: 67 IN

## 2023-02-16 DIAGNOSIS — B33.24 VIRAL CARDIOMYOPATHY: Primary | ICD-10-CM

## 2023-02-16 DIAGNOSIS — E78.2 MIXED HYPERLIPIDEMIA: ICD-10-CM

## 2023-02-16 DIAGNOSIS — I42.0 CARDIOMYOPATHY, DILATED: ICD-10-CM

## 2023-02-16 PROCEDURE — 99213 OFFICE O/P EST LOW 20 MIN: CPT | Performed by: INTERNAL MEDICINE

## 2023-02-16 RX ORDER — GABAPENTIN 300 MG/1
CAPSULE ORAL AS NEEDED
COMMUNITY
Start: 2022-12-13

## 2023-02-16 RX ORDER — FUROSEMIDE 20 MG/1
20 TABLET ORAL AS NEEDED
Qty: 30 TABLET | Refills: 3 | Status: SHIPPED | OUTPATIENT
Start: 2023-02-16

## 2023-02-16 RX ORDER — AMOXICILLIN 500 MG/1
CAPSULE ORAL
COMMUNITY
Start: 2023-02-04

## 2023-02-16 RX ORDER — NALOXEGOL OXALATE 12.5 MG/1
TABLET, FILM COATED ORAL AS NEEDED
COMMUNITY
Start: 2022-12-15

## 2023-02-16 RX ORDER — LOSARTAN POTASSIUM 100 MG/1
100 TABLET ORAL DAILY
Qty: 90 TABLET | Refills: 0 | Status: SHIPPED | OUTPATIENT
Start: 2023-02-16

## 2023-02-16 RX ORDER — SPIRONOLACTONE 25 MG/1
12.5 TABLET ORAL DAILY
Qty: 90 TABLET | Refills: 3 | Status: SHIPPED | OUTPATIENT
Start: 2023-02-16

## 2023-02-16 RX ORDER — CARVEDILOL 12.5 MG/1
12.5 TABLET ORAL 2 TIMES DAILY WITH MEALS
Qty: 90 TABLET | Refills: 3 | Status: SHIPPED | OUTPATIENT
Start: 2023-02-16

## 2023-02-16 RX ORDER — FAMOTIDINE 20 MG/1
TABLET, FILM COATED ORAL
COMMUNITY
Start: 2022-12-15

## 2023-02-16 RX ORDER — ONDANSETRON 4 MG/1
TABLET, FILM COATED ORAL AS NEEDED
COMMUNITY
Start: 2022-11-17

## 2023-02-16 NOTE — PROGRESS NOTES
[facility] Cardiology  Office Progress Note  Monica Stafford  1945  [address] [place] [address]       Visit Date: 02/16/23    PCP: Julio Mason MD  [address] [facility] [address]  [place] [address]    IDENTIFICATION: A 77 y.o. 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    PROBLEM LIST:   1. CHF  1. 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 [facility] - med rx- resolved by 2009  3. 8/12 echo osh EF 60%  4. 3/21 2D Echo: Mild LA enlargement. Mild global hypokinesia of left ventricle. LVEF calc = 46%.(LBBB felt inaccurate estimate)   2. LBBB chronic 2006 1. 8/16 MPS wnl EF 70%  2. 2018 zio wnl  3. HL  4. HTN  5. Nissen fundoplication 2016 Ismael  6. R partial knee, 2022 L total knee([facility])      CC:   Chief Complaint   Patient presents with   • Chronic systolic congestive heart failure        Allergies  No Known Allergies    Current Medications    Current Outpatient Medications:   •  allopurinol (ZYLOPRIM) 100 MG tablet, Take 100 mg by mouth daily., Disp: , Rfl:   •  amoxicillin (AMOXIL) 500 MG capsule, Takes 1 hour prior to dental appts., Disp: , Rfl:   •  carvedilol (COREG) 12.5 MG tablet, Take 1 tablet by mouth 2 (Two) Times a Day With Meals., Disp: 90 tablet, Rfl: 3  •  Coenzyme Q10 (COQ10 PO), Take 1 tablet by mouth Daily., Disp: , Rfl:   •  docusate sodium (COLACE) 100 MG capsule, Take 100 mg by mouth As Needed., Disp: , Rfl:   •  famotidine (PEPCID) 20 MG tablet, TAKE ONE TABLET BY MOUTH TWICE DAILY FOR STOMACH ACID, Disp: , Rfl:   •  furosemide (LASIX) 20 MG tablet, Take 20 mg by mouth As Needed., Disp: , Rfl:   •  gabapentin (NEURONTIN) 300 MG capsule, As Needed., Disp: , Rfl:   •  latanoprost (XALATAN) 0.005 % ophthalmic solution, Administer 1 drop to both eyes Daily., Disp: , Rfl:   •  losartan (COZAAR) 100 MG tablet, Take 1 tablet by mouth Daily., Disp: 90 tablet, Rfl: 0  •  montelukast (SINGULAIR) 10 MG tablet, 10 mg Daily., Disp: , Rfl:   •  Movantik 12.5 MG  "tablet, As Needed., Disp: , Rfl:   •  ondansetron (ZOFRAN) 4 MG tablet, As Needed., Disp: , Rfl:   •  potassium chloride (K-DUR) 10 MEQ CR tablet, Take 10 mEq by mouth daily as needed., Disp: , Rfl:   •  rosuvastatin (CRESTOR) 5 MG tablet, Take 10 mg by mouth Daily., Disp: , Rfl:   •  spironolactone (ALDACTONE) 25 MG tablet, Take 1 tablet by mouth Daily. (Patient taking differently: Take 12.5 mg by mouth Daily.), Disp: 90 tablet, Rfl: 3  •  traZODone (DESYREL) 50 MG tablet, 50 mg Every Night., Disp: , Rfl:       History of Present Illness   Monica Stafford is a 77 y.o. year old female here for follow up.  Largely recovered from her knee surgery.  She is done well in physical therapy and is excited about [place] potential round of golf  No shortness of breath palpitations or presyncope      OBJECTIVE:  Vitals:    02/16/23 1145   BP: 130/68   BP Location: Right arm   Patient Position: Sitting   Pulse: 67   SpO2: 98%   Height: 170.2 cm (67\")     Body mass index is 26.72 kg/m².    Constitutional:       Appearance: Healthy appearance. Not in distress.   Neck:      Vascular: No JVR. JVD normal.   Pulmonary:      Effort: Pulmonary effort is normal.      Breath sounds: Normal breath sounds. No wheezing. No rhonchi. No rales.   Chest:      Chest wall: Not tender to palpatation.   Cardiovascular:      PMI at left midclavicular line. Normal rate. Regular rhythm. Normal S1. Normal S2.      Murmurs: There is no murmur.      No gallop. No click. No rub.   Pulses:     Intact distal pulses.   Edema:     Peripheral edema absent.   Abdominal:      General: Bowel sounds are normal.      Palpations: Abdomen is soft.      Tenderness: There is no abdominal tenderness.   Musculoskeletal: Normal range of motion.         General: No tenderness. Skin:     General: Skin is warm and dry.   Neurological:      General: No focal deficit present.      Mental Status: Alert and oriented to person, place and " time.         Diagnostic Data:  Procedures      ASSESSMENT:   Diagnosis Plan   1. Viral cardiomyopathy (HCC)        2. Mixed hyperlipidemia            PLAN:  Historical nonischemic cardiomyopathy largely resolved we will follow-up echocardiogram 2020    Continue losartan 100 add spironolactone 25 continue carvedilol.  Continue furosemide only as needed which is infrequently she states.     Continue statin therapy for mixed dyslipidemia          Jasvir Tan MD, FACC

## 2023-09-15 RX ORDER — CARVEDILOL 12.5 MG/1
TABLET ORAL
Qty: 90 TABLET | Refills: 1 | Status: SHIPPED | OUTPATIENT
Start: 2023-09-15

## 2023-11-30 RX ORDER — CARVEDILOL 12.5 MG/1
TABLET ORAL
Qty: 90 TABLET | Refills: 1 | Status: SHIPPED | OUTPATIENT
Start: 2023-11-30

## 2024-01-29 ENCOUNTER — TRANSCRIBE ORDERS (OUTPATIENT)
Dept: ADMINISTRATIVE | Facility: HOSPITAL | Age: 79
End: 2024-01-29
Payer: MEDICARE

## 2024-01-29 DIAGNOSIS — Z78.0 ASYMPTOMATIC MENOPAUSAL STATE: Primary | ICD-10-CM

## 2024-01-31 ENCOUNTER — TELEPHONE (OUTPATIENT)
Dept: CARDIOLOGY | Facility: CLINIC | Age: 79
End: 2024-01-31

## 2024-01-31 ENCOUNTER — HOSPITAL ENCOUNTER (OUTPATIENT)
Dept: CARDIOLOGY | Facility: HOSPITAL | Age: 79
Discharge: HOME | End: 2024-01-31
Admitting: INTERNAL MEDICINE
Payer: MEDICARE

## 2024-01-31 VITALS — HEIGHT: 67 IN | WEIGHT: 170 LBS | BODY MASS INDEX: 26.68 KG/M2

## 2024-01-31 DIAGNOSIS — I42.0 CARDIOMYOPATHY, DILATED: ICD-10-CM

## 2024-01-31 DIAGNOSIS — B33.24 VIRAL CARDIOMYOPATHY: ICD-10-CM

## 2024-01-31 LAB
BH CV ECHO MEAS - AO MAX PG: 5.8 MMHG
BH CV ECHO MEAS - AO MEAN PG: 4 MMHG
BH CV ECHO MEAS - AO ROOT DIAM: 2.8 CM
BH CV ECHO MEAS - AO V2 MAX: 120 CM/SEC
BH CV ECHO MEAS - AO V2 VTI: 25 CM
BH CV ECHO MEAS - AVA(I,D): 1.96 CM2
BH CV ECHO MEAS - EDV(CUBED): 58 ML
BH CV ECHO MEAS - EDV(MOD-SP2): 71 ML
BH CV ECHO MEAS - EDV(MOD-SP4): 119 ML
BH CV ECHO MEAS - EF(MOD-BP): 56 %
BH CV ECHO MEAS - EF(MOD-SP2): 46.5 %
BH CV ECHO MEAS - EF(MOD-SP4): 56.3 %
BH CV ECHO MEAS - ESV(CUBED): 17.4 ML
BH CV ECHO MEAS - ESV(MOD-SP2): 38 ML
BH CV ECHO MEAS - ESV(MOD-SP4): 52 ML
BH CV ECHO MEAS - FS: 33.1 %
BH CV ECHO MEAS - IVS/LVPW: 1.06 CM
BH CV ECHO MEAS - IVSD: 0.93 CM
BH CV ECHO MEAS - LA DIMENSION: 3.5 CM
BH CV ECHO MEAS - LAT PEAK E' VEL: 4.6 CM/SEC
BH CV ECHO MEAS - LV DIASTOLIC VOL/BSA (35-75): 63.1 CM2
BH CV ECHO MEAS - LV MASS(C)D: 104.3 GRAMS
BH CV ECHO MEAS - LV MAX PG: 2.3 MMHG
BH CV ECHO MEAS - LV MEAN PG: 1 MMHG
BH CV ECHO MEAS - LV SYSTOLIC VOL/BSA (12-30): 27.6 CM2
BH CV ECHO MEAS - LV V1 MAX: 75.9 CM/SEC
BH CV ECHO MEAS - LV V1 VTI: 15.6 CM
BH CV ECHO MEAS - LVIDD: 3.9 CM
BH CV ECHO MEAS - LVIDS: 2.6 CM
BH CV ECHO MEAS - LVOT AREA: 3.1 CM2
BH CV ECHO MEAS - LVOT DIAM: 2 CM
BH CV ECHO MEAS - LVPWD: 0.88 CM
BH CV ECHO MEAS - MED PEAK E' VEL: 4.89 CM/SEC
BH CV ECHO MEAS - MV A MAX VEL: 77.5 CM/SEC
BH CV ECHO MEAS - MV DEC SLOPE: 377 CM/SEC2
BH CV ECHO MEAS - MV DEC TIME: 0.24 SEC
BH CV ECHO MEAS - MV E MAX VEL: 62.8 CM/SEC
BH CV ECHO MEAS - MV E/A: 0.81
BH CV ECHO MEAS - MV MAX PG: 3.8 MMHG
BH CV ECHO MEAS - MV MEAN PG: 1 MMHG
BH CV ECHO MEAS - MV P1/2T: 56 MSEC
BH CV ECHO MEAS - MV V2 VTI: 21.3 CM
BH CV ECHO MEAS - MVA(P1/2T): 3.9 CM2
BH CV ECHO MEAS - MVA(VTI): 2.3 CM2
BH CV ECHO MEAS - PA ACC SLOPE: 730 CM/SEC2
BH CV ECHO MEAS - PA ACC TIME: 0.11 SEC
BH CV ECHO MEAS - PA V2 MAX: 114 CM/SEC
BH CV ECHO MEAS - PI END-D VEL: 125 CM/SEC
BH CV ECHO MEAS - RAP SYSTOLE: 3 MMHG
BH CV ECHO MEAS - RVSP: 29 MMHG
BH CV ECHO MEAS - SI(MOD-SP2): 17.5 ML/M2
BH CV ECHO MEAS - SI(MOD-SP4): 35.5 ML/M2
BH CV ECHO MEAS - SV(LVOT): 49 ML
BH CV ECHO MEAS - SV(MOD-SP2): 33 ML
BH CV ECHO MEAS - SV(MOD-SP4): 67 ML
BH CV ECHO MEAS - TAPSE (>1.6): 1.9 CM
BH CV ECHO MEAS - TR MAX PG: 26.4 MMHG
BH CV ECHO MEAS - TR MAX VEL: 257 CM/SEC
BH CV ECHO MEASUREMENTS AVERAGE E/E' RATIO: 13.23
BH CV XLRA - RV BASE: 3.5 CM
BH CV XLRA - RV LENGTH: 5.8 CM
BH CV XLRA - RV MID: 2.8 CM
BH CV XLRA - TDI S': 12.2 CM/SEC
LEFT ATRIUM VOLUME INDEX: 23.3 ML/M2

## 2024-01-31 PROCEDURE — 93306 TTE W/DOPPLER COMPLETE: CPT

## 2024-02-01 NOTE — TELEPHONE ENCOUNTER
Spoke with pt, she is scheduled for right partial knee replacement with Dr. Viraj Noriega at [facility]. Anticipates need later for left TKR.     She needs cardiac clearance.  She reports BP has been elevated since last visit with -180/80-90. HR 70s. She denies chest pain and edema. She does have mild shortness of breath with exertion. She was seen by local APRN who increased losartan to 50 mg twice daily and is having her take furosemide 20 mg daily. She has been on this regimen for a week without significant change in BP.    Will work in to see [facility] in the [place] office for clearance and BP issues. She has not had recent labs with PCP.  
sternal precautions

## 2024-03-05 RX ORDER — SPIRONOLACTONE 25 MG/1
12.5 TABLET ORAL DAILY
Qty: 45 TABLET | Refills: 1 | Status: SHIPPED | OUTPATIENT
Start: 2024-03-05

## 2024-03-06 NOTE — PROGRESS NOTES
[facility] Cardiology  Office Progress Note  Monica Stafford  1945  [address] [facility] [place] [address]       Visit Date: 03/07/24    PCP: Pascale Langston, APRN  [address] [facility] [address] [facility] [place] [address]    IDENTIFICATION: A 78 y.o. female  from [place] in [place].     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 Mild global hypokinesia of left ventricle. LVEF calc = 46%.(LBBB felt inaccurate estimate)   1/24 Echo: EF 56-60%, grade I dd, trivial pericardial effusion  LBBB chronic 2006 8/16 MPS wnl EF 70%  2018 zio wnl  HL  HTN  Nissen fundoplication 2016 Ismael  R partial knee, 2022 L total knee([facility])       CC:   Chief Complaint   Patient presents with    Viral cardiomyopathy       Allergies  No Known Allergies    Current Medications  Current Outpatient Medications   Medication Instructions    allopurinol (ZYLOPRIM) 100 mg, Oral, Daily    amoxicillin (AMOXIL) 500 MG capsule Takes 1 hour prior to dental appts.      carvedilol (COREG) 12.5 MG tablet TAKE 1 TABLET BY MOUTH TWICE A DAY WITH A MEAL    Coenzyme Q10 (COQ10 PO) 1 tablet, Oral, Daily    docusate sodium (COLACE) 100 mg, Oral, As Needed    famotidine (PEPCID) 20 MG tablet TAKE ONE TABLET BY MOUTH TWICE DAILY FOR STOMACH ACID    furosemide (LASIX) 20 mg, Oral, As Needed    gabapentin (NEURONTIN) 300 MG capsule As Needed    latanoprost (XALATAN) 0.005 % ophthalmic solution 1 drop, Both Eyes, Daily    losartan (COZAAR) 100 mg, Oral, Daily    montelukast (SINGULAIR) 10 mg, Daily    Movantik 12.5 MG tablet As Needed    ondansetron (ZOFRAN) 4 MG tablet As Needed    potassium chloride (K-DUR) 10 MEQ CR tablet 10 mEq, Oral, Daily PRN    rosuvastatin (CRESTOR) 10 mg, Oral, Daily    spironolactone (ALDACTONE) 12.5 mg, Oral, Daily    traZODone (DESYREL) 100 mg, Oral, Nightly        History of Present Illness  "  Monica Stafford is a 78 y.o. year old female here for follow up.    Was having some labile blood pressures and recent increase of carvedilol with improvement.  She injured her back then attempting to play pickle ball notes some right  Lower extremity discomfort and venous engorgement medial aspect of her calf    OBJECTIVE:  Vitals:    03/07/24 1533   BP: 116/66   BP Location: Right arm   Patient Position: Sitting   Cuff Size: Adult   Pulse: 79   SpO2: 98%   Weight: 78.5 kg (173 lb)   Height: 170.2 cm (67\")     Body mass index is 27.1 kg/m².    Constitutional:       Appearance: Healthy appearance. Not in distress.   Neck:      Vascular: No JVR. JVD normal.   Pulmonary:      Effort: Pulmonary effort is normal.      Breath sounds: Normal breath sounds. No wheezing. No rhonchi. No rales.   Chest:      Chest wall: Not tender to palpatation.   Cardiovascular:      PMI at left midclavicular line. Normal rate. Regular rhythm. Normal S1. Normal S2.       Murmurs: There is no murmur.      No gallop.  No click. No rub.   Pulses:     Intact distal pulses.   Edema:     Peripheral edema present.     Comments: Superficial varicose vein complex below the knee on the right  Abdominal:      General: Bowel sounds are normal.      Palpations: Abdomen is soft.      Tenderness: There is no abdominal tenderness.   Musculoskeletal: Normal range of motion.         General: No tenderness. Skin:     General: Skin is warm and dry.   Neurological:      General: No focal deficit present.      Mental Status: Alert and oriented to person, place and time.         Diagnostic Data:  Procedures    Advance Care Planning   ACP discussion was held with the patient during this visit. Patient has an advance directive in EMR which is still valid.         ASSESSMENT:   Diagnosis Plan   1. Viral cardiomyopathy        2. Mixed hyperlipidemia        3. Varicose veins of right lower extremity with inflammation            PLAN:  Cardiomyopathy with preserved LV " function on echocardiogram continued current medical regimen    Hypertension uncontrolled on increased dose of carvedilol    Varicose vein for referral to vascular for potential ablation        Jasvir Tan MD, FACC

## 2024-03-07 ENCOUNTER — OFFICE VISIT (OUTPATIENT)
Dept: CARDIOLOGY | Facility: CLINIC | Age: 79
End: 2024-03-07
Payer: MEDICARE

## 2024-03-07 VITALS
BODY MASS INDEX: 27.15 KG/M2 | OXYGEN SATURATION: 98 % | WEIGHT: 173 LBS | SYSTOLIC BLOOD PRESSURE: 116 MMHG | HEART RATE: 79 BPM | HEIGHT: 67 IN | DIASTOLIC BLOOD PRESSURE: 66 MMHG

## 2024-03-07 DIAGNOSIS — E78.2 MIXED HYPERLIPIDEMIA: ICD-10-CM

## 2024-03-07 DIAGNOSIS — I83.11 VARICOSE VEINS OF RIGHT LOWER EXTREMITY WITH INFLAMMATION: ICD-10-CM

## 2024-03-07 DIAGNOSIS — B33.24 VIRAL CARDIOMYOPATHY: Primary | ICD-10-CM

## 2024-03-07 PROCEDURE — 99214 OFFICE O/P EST MOD 30 MIN: CPT | Performed by: INTERNAL MEDICINE

## 2024-09-18 ENCOUNTER — OFFICE VISIT (OUTPATIENT)
Dept: CARDIOLOGY | Facility: CLINIC | Age: 79
End: 2024-09-18
Payer: MEDICARE

## 2024-09-18 VITALS
BODY MASS INDEX: 26.02 KG/M2 | WEIGHT: 165.8 LBS | OXYGEN SATURATION: 99 % | SYSTOLIC BLOOD PRESSURE: 120 MMHG | HEART RATE: 61 BPM | DIASTOLIC BLOOD PRESSURE: 70 MMHG | HEIGHT: 67 IN

## 2024-09-18 DIAGNOSIS — I10 PRIMARY HYPERTENSION: ICD-10-CM

## 2024-09-18 DIAGNOSIS — I87.2 VENOUS INSUFFICIENCY: ICD-10-CM

## 2024-09-18 DIAGNOSIS — I42.8 OTHER CARDIOMYOPATHY: Primary | ICD-10-CM

## 2024-09-18 PROCEDURE — 99214 OFFICE O/P EST MOD 30 MIN: CPT | Performed by: INTERNAL MEDICINE

## 2024-10-03 ENCOUNTER — TRANSCRIBE ORDERS (OUTPATIENT)
Dept: ADMINISTRATIVE | Facility: HOSPITAL | Age: 79
End: 2024-10-03
Payer: MEDICARE

## 2024-10-03 DIAGNOSIS — R10.9 ABDOMINAL PAIN, UNSPECIFIED ABDOMINAL LOCATION: Primary | ICD-10-CM

## 2024-10-04 ENCOUNTER — HOSPITAL ENCOUNTER (OUTPATIENT)
Dept: CT IMAGING | Facility: HOSPITAL | Age: 79
Discharge: HOME | End: 2024-10-04
Payer: MEDICARE

## 2024-10-04 DIAGNOSIS — R10.9 ABDOMINAL PAIN, UNSPECIFIED ABDOMINAL LOCATION: ICD-10-CM

## 2024-10-04 PROCEDURE — 25510000001 IOPAMIDOL 61 % SOLUTION: Performed by: NURSE PRACTITIONER

## 2024-10-04 PROCEDURE — 74178 CT ABD&PLV WO CNTR FLWD CNTR: CPT

## 2024-10-04 RX ORDER — IOPAMIDOL 612 MG/ML
100 INJECTION, SOLUTION INTRAVASCULAR
Status: COMPLETED | OUTPATIENT
Start: 2024-10-04 | End: 2024-10-04

## 2024-10-04 RX ADMIN — IOPAMIDOL 80 ML: 612 INJECTION, SOLUTION INTRAVENOUS at 09:17

## 2024-10-07 LAB — CREAT BLDA-MCNC: 1.2 MG/DL (ref 0.6–1.3)

## 2025-07-16 ENCOUNTER — TRANSCRIBE ORDERS (OUTPATIENT)
Dept: ADMINISTRATIVE | Facility: HOSPITAL | Age: 80
End: 2025-07-16
Payer: MEDICARE

## 2025-07-16 DIAGNOSIS — Z78.0 ASYMPTOMATIC POSTMENOPAUSAL STATUS: Primary | ICD-10-CM

## 2025-08-05 ENCOUNTER — HOSPITAL ENCOUNTER (OUTPATIENT)
Facility: HOSPITAL | Age: 80
Discharge: HOME | End: 2025-08-05
Admitting: NURSE PRACTITIONER
Payer: MEDICARE

## 2025-08-05 DIAGNOSIS — Z78.0 ASYMPTOMATIC POSTMENOPAUSAL STATUS: ICD-10-CM

## 2025-08-05 PROCEDURE — 77080 DXA BONE DENSITY AXIAL: CPT

## 2025-08-05 PROCEDURE — 77080 DXA BONE DENSITY AXIAL: CPT | Performed by: RADIOLOGY
